# Patient Record
Sex: FEMALE | Race: WHITE | Employment: UNEMPLOYED | URBAN - METROPOLITAN AREA
[De-identification: names, ages, dates, MRNs, and addresses within clinical notes are randomized per-mention and may not be internally consistent; named-entity substitution may affect disease eponyms.]

---

## 2022-04-11 ENCOUNTER — TELEPHONE (OUTPATIENT)
Dept: OTHER | Age: 57
End: 2022-04-11

## 2022-04-11 NOTE — TELEPHONE ENCOUNTER
No acute concerns  Needs to reach out to prior PCP for RX until her NP appt.   No immunization records in [employer]

## 2022-04-11 NOTE — TELEPHONE ENCOUNTER
Pt name and  verified. Who is your current or most recent primary care provider and   which hospital were they affiliated with? Sunshine Carcamo at [facility]. How long did you see them for? Over 30 years   Is there a reason that you are looking for a new provider? Pt is dissatisfied with the service. Would you like to provide your e-mail to sign up for out My Chart system? no  The benefits of being a My Chart member are:    -Request medical appointments  -View your health summary from the My Chart electronic health record. -View test results.  -Request prescription renewals.  -Access trusted health information resources.  -Communicate electronically and securely with your medical care team.    Would you like a test message or letter with your activation code? No     Do you have a preference about the provider you will see? [address]    When was your last Annual or Well Child Exam? 2019    Do you have any ongoing or chronic conditions you are currently being treated   for (e.g. Hypertension, Diabetes, Depression, etc)? If yes list: GERD    Have you been seen by a provider for these chronic conditions in the last 6 months? If so, who? No    Do you need an appointment to establish care or is there something more   urgent you need to be seen for? Explain: Stomach problems, will need a new Rx.      Are you on any medicines that require a special prescription such as a sleeping pills, pain medication or stimulants? (if no go ahead and book appointment, if yes please read the following: no    \"Please be aware that our provider's may not prescribe these medications at the first visit as they need to get to know more about you and your medical problems/history before they will safely prescribe these\" Inforemd? Yes or No No    Do you have any questions about what we discussed? no  (If no go ahead and book, if yes please get the details and send to the practice)    Your new patient appointment is booked on 07/19 at 9:20 with Alaina Staley. **Thank you for choosing us for your health care needs. Ms. Israel Hermosillo do want to let you know that you will need to contact your insurance company and make them aware of your new Primary Care Provider which is Alaina Staley. Also Ms. Jonny Jaime a new patient packet will be coming to you. Would you like to have this mailed or faxed to you? mail If faxed what is the number? N/a   We would greatly appreciate it if you could complete the release of records and either mail back or drop off at out office within 7 days of receipt.     Notes:     #: [phone] (home)      [place] [id number]

## 2022-08-02 PROBLEM — R73.03 PREDIABETES: Status: ACTIVE | Noted: 2022-08-02

## 2022-08-02 PROBLEM — F41.3 OTHER MIXED ANXIETY DISORDERS: Status: ACTIVE | Noted: 2022-08-02

## 2022-08-02 PROBLEM — E55.9 VITAMIN D DEFICIENCY: Status: ACTIVE | Noted: 2022-08-02

## 2022-08-02 PROBLEM — K21.9 GASTROESOPHAGEAL REFLUX DISEASE WITHOUT ESOPHAGITIS: Status: ACTIVE | Noted: 2022-08-02

## 2022-08-02 PROBLEM — E78.00 HYPERCHOLESTEREMIA: Status: ACTIVE | Noted: 2022-08-02
